# Patient Record
Sex: FEMALE | Race: WHITE | NOT HISPANIC OR LATINO | Employment: UNEMPLOYED | URBAN - METROPOLITAN AREA
[De-identification: names, ages, dates, MRNs, and addresses within clinical notes are randomized per-mention and may not be internally consistent; named-entity substitution may affect disease eponyms.]

---

## 2020-05-19 ENCOUNTER — TELEPHONE (OUTPATIENT)
Dept: OBSTETRICS AND GYNECOLOGY | Facility: CLINIC | Age: 65
End: 2020-05-19

## 2020-07-15 ENCOUNTER — TELEPHONE (OUTPATIENT)
Dept: OBSTETRICS AND GYNECOLOGY | Facility: CLINIC | Age: 65
End: 2020-07-15

## 2020-07-16 ENCOUNTER — TELEPHONE (OUTPATIENT)
Dept: OBSTETRICS AND GYNECOLOGY | Facility: CLINIC | Age: 65
End: 2020-07-16

## 2020-07-16 NOTE — TELEPHONE ENCOUNTER
----- Message from Gertrude Mccarthy sent at 7/16/2020  8:26 AM CDT -----  Regarding: Appt Access  Contact: Pt  Type:  Sooner Apoointment Request    Caller is requesting a sooner appointment.  Caller declined first available appointment listed below.  Caller will not accept being placed on the waitlist and is requesting a message be sent to doctor.  Name of Caller:Sabrinawaldo Marlow   When is the first available appointment?10/15/20  Symptoms:Re Pap  Would the patient rather a call back or a response via [facility]? Call back   Best Call Back Number:[phone]  Additional Information: States that she is leaving to go out of town on 07/28/20 and would like an appt before she left.

## 2020-07-19 NOTE — PROGRESS NOTES
"CC: repeat Pap      Sabrina Marlow is a 65 y.o. female  who comes in today for a repeat pap smear. Her most recent Pap smear was on 11/5/19 and showed low-grade squamous intraepithelial neoplasia (LGSIL - encompassing HPV,mild dysplasia,CAROL I). Patient does not have a history of abnormal pap smears. She has had a colposcopy 11/18/19 with benign biopsy at 12 o'clock and benign ECC.     Past Medical History:   Diagnosis Date    Bronchiolitis     Squamous cell cancer of lip       History reviewed. No pertinent surgical history.   Social History     Tobacco Use    Smoking status: Never Smoker    Smokeless tobacco: Never Used   Substance Use Topics    Alcohol use: Yes     Frequency: 2-3 times a week    Drug use: Never        Current Outpatient Medications:     albuterol sulfate 2.5 mg/0.5 mL Nebu, , Disp: , Rfl:     ARMOUR THYROID 60 mg Tab, , Disp: , Rfl:     sodium chloride 7% 7 % nebulizer solution, , Disp: , Rfl:    Review of patient's allergies indicates:  No Known Allergies     ROS:  GENERAL: Denies weight gain or weight loss. Feeling well overall.    NODES: Denies enlarged lymph nodes.   ABDOMEN: Denies abdominal pain, constipation, diarrhea, nausea, vomiting or rectal bleeding.   URINARY: Denies frequency, dysuria, hematuria, or burning on urination.  REPRODUCTIVE: See HPI.   HEMATOLOGIC: Denies easy bruisability or excessive bleeding with the exception of menstrual cycles.       PHYSICAL EXAM:    /62   Pulse 71   Ht 5' 9" (1.753 m)   Wt 64.8 kg (142 lb 12.8 oz)   LMP  (LMP Unknown)   BMI 21.09 kg/m²    Body mass index is 21.09 kg/m².     APPEARANCE: Well nourished, well developed, in no acute distress.  AFFECT: WNL, alert and oriented x 3  PELVIC: Normal external genitalia without lesions.  Normal hair distribution.  Adequate perineal body, normal urethral meatus.  Urethra and bladder without tenderness or masses. Vagina moist and well rugated without lesions or discharge.  Cervix pink, " without lesions, discharge or tenderness.        LGSIL on Pap smear of cervix  -     Liquid-based pap smear, screening      6 months follow up

## 2020-07-20 ENCOUNTER — OFFICE VISIT (OUTPATIENT)
Dept: OBSTETRICS AND GYNECOLOGY | Facility: CLINIC | Age: 65
End: 2020-07-20
Payer: COMMERCIAL

## 2020-07-20 VITALS
SYSTOLIC BLOOD PRESSURE: 101 MMHG | WEIGHT: 142.81 LBS | HEART RATE: 71 BPM | BODY MASS INDEX: 21.15 KG/M2 | HEIGHT: 69 IN | DIASTOLIC BLOOD PRESSURE: 62 MMHG

## 2020-07-20 DIAGNOSIS — R87.612 LGSIL ON PAP SMEAR OF CERVIX: Primary | ICD-10-CM

## 2020-07-20 PROCEDURE — 3008F BODY MASS INDEX DOCD: CPT | Mod: CPTII,S$GLB,, | Performed by: OBSTETRICS & GYNECOLOGY

## 2020-07-20 PROCEDURE — 3008F PR BODY MASS INDEX (BMI) DOCUMENTED: ICD-10-PCS | Mod: CPTII,S$GLB,, | Performed by: OBSTETRICS & GYNECOLOGY

## 2020-07-20 PROCEDURE — 99213 PR OFFICE/OUTPT VISIT, EST, LEVL III, 20-29 MIN: ICD-10-PCS | Mod: S$GLB,,, | Performed by: OBSTETRICS & GYNECOLOGY

## 2020-07-20 PROCEDURE — 99213 OFFICE O/P EST LOW 20 MIN: CPT | Mod: S$GLB,,, | Performed by: OBSTETRICS & GYNECOLOGY

## 2020-07-20 RX ORDER — ALBUTEROL SULFATE 2.5 MG/.5ML
SOLUTION RESPIRATORY (INHALATION)
COMMUNITY
Start: 2020-06-05 | End: 2023-03-20 | Stop reason: SDUPTHER

## 2020-07-20 RX ORDER — SULFAMETHOXAZOLE AND TRIMETHOPRIM 800; 160 MG/1; MG/1
TABLET ORAL
COMMUNITY
Start: 2020-07-17

## 2020-07-20 RX ORDER — SODIUM CHLORIDE FOR INHALATION 7 %
VIAL, NEBULIZER (ML) INHALATION
COMMUNITY
Start: 2020-06-05

## 2020-07-21 ENCOUNTER — TELEPHONE (OUTPATIENT)
Dept: OBSTETRICS AND GYNECOLOGY | Facility: CLINIC | Age: 65
End: 2020-07-21

## 2020-07-21 NOTE — TELEPHONE ENCOUNTER
----- Message from Miriam Wilson sent at 7/21/2020  4:42 PM CDT -----  Patient called in regards to speak to an nurse about her chart notes , please call back at [phone].        Thanks,  Miriam Wilson

## 2020-07-29 ENCOUNTER — TELEPHONE (OUTPATIENT)
Dept: OBSTETRICS AND GYNECOLOGY | Facility: CLINIC | Age: 65
End: 2020-07-29

## 2021-02-23 ENCOUNTER — OFFICE VISIT (OUTPATIENT)
Dept: OBSTETRICS AND GYNECOLOGY | Facility: CLINIC | Age: 66
End: 2021-02-23
Payer: MEDICARE

## 2021-02-23 VITALS
HEART RATE: 84 BPM | WEIGHT: 140.63 LBS | HEIGHT: 69 IN | SYSTOLIC BLOOD PRESSURE: 110 MMHG | DIASTOLIC BLOOD PRESSURE: 78 MMHG | BODY MASS INDEX: 20.83 KG/M2

## 2021-02-23 DIAGNOSIS — Z12.31 SCREENING MAMMOGRAM, ENCOUNTER FOR: ICD-10-CM

## 2021-02-23 DIAGNOSIS — Z13.820 SCREENING FOR OSTEOPOROSIS: ICD-10-CM

## 2021-02-23 DIAGNOSIS — Z01.419 WELL WOMAN EXAM WITH ROUTINE GYNECOLOGICAL EXAM: Primary | ICD-10-CM

## 2021-02-23 PROCEDURE — 1101F PR PT FALLS ASSESS DOC 0-1 FALLS W/OUT INJ PAST YR: ICD-10-PCS | Mod: CPTII,S$GLB,, | Performed by: OBSTETRICS & GYNECOLOGY

## 2021-02-23 PROCEDURE — G0101 CA SCREEN;PELVIC/BREAST EXAM: HCPCS | Mod: S$GLB,,, | Performed by: OBSTETRICS & GYNECOLOGY

## 2021-02-23 PROCEDURE — 3288F FALL RISK ASSESSMENT DOCD: CPT | Mod: CPTII,S$GLB,, | Performed by: OBSTETRICS & GYNECOLOGY

## 2021-02-23 PROCEDURE — 1126F PR PAIN SEVERITY QUANTIFIED, NO PAIN PRESENT: ICD-10-PCS | Mod: S$GLB,,, | Performed by: OBSTETRICS & GYNECOLOGY

## 2021-02-23 PROCEDURE — 3008F PR BODY MASS INDEX (BMI) DOCUMENTED: ICD-10-PCS | Mod: CPTII,S$GLB,, | Performed by: OBSTETRICS & GYNECOLOGY

## 2021-02-23 PROCEDURE — 3008F BODY MASS INDEX DOCD: CPT | Mod: CPTII,S$GLB,, | Performed by: OBSTETRICS & GYNECOLOGY

## 2021-02-23 PROCEDURE — 1126F AMNT PAIN NOTED NONE PRSNT: CPT | Mod: S$GLB,,, | Performed by: OBSTETRICS & GYNECOLOGY

## 2021-02-23 PROCEDURE — 1101F PT FALLS ASSESS-DOCD LE1/YR: CPT | Mod: CPTII,S$GLB,, | Performed by: OBSTETRICS & GYNECOLOGY

## 2021-02-23 PROCEDURE — 3288F PR FALLS RISK ASSESSMENT DOCUMENTED: ICD-10-PCS | Mod: CPTII,S$GLB,, | Performed by: OBSTETRICS & GYNECOLOGY

## 2021-02-23 PROCEDURE — G0101 PR CA SCREEN;PELVIC/BREAST EXAM: ICD-10-PCS | Mod: S$GLB,,, | Performed by: OBSTETRICS & GYNECOLOGY

## 2021-02-23 RX ORDER — PROGESTERONE 100 MG/1
100 CAPSULE ORAL DAILY
COMMUNITY

## 2021-03-22 ENCOUNTER — PATIENT MESSAGE (OUTPATIENT)
Dept: OBSTETRICS AND GYNECOLOGY | Facility: CLINIC | Age: 66
End: 2021-03-22

## 2021-03-23 ENCOUNTER — TELEPHONE (OUTPATIENT)
Dept: OBSTETRICS AND GYNECOLOGY | Facility: CLINIC | Age: 66
End: 2021-03-23

## 2022-02-28 ENCOUNTER — OFFICE VISIT (OUTPATIENT)
Dept: OBSTETRICS AND GYNECOLOGY | Facility: CLINIC | Age: 67
End: 2022-02-28
Payer: MEDICARE

## 2022-02-28 VITALS
HEART RATE: 80 BPM | DIASTOLIC BLOOD PRESSURE: 71 MMHG | SYSTOLIC BLOOD PRESSURE: 111 MMHG | HEIGHT: 69 IN | BODY MASS INDEX: 21.33 KG/M2 | WEIGHT: 144 LBS

## 2022-02-28 DIAGNOSIS — Z01.419 ENCOUNTER FOR ROUTINE GYNECOLOGIC EXAMINATION IN MEDICARE PATIENT: Primary | ICD-10-CM

## 2022-02-28 DIAGNOSIS — Z78.0 MENOPAUSE: ICD-10-CM

## 2022-02-28 DIAGNOSIS — Z12.31 SCREENING MAMMOGRAM, ENCOUNTER FOR: ICD-10-CM

## 2022-02-28 DIAGNOSIS — Z12.11 COLON CANCER SCREENING: ICD-10-CM

## 2022-02-28 PROCEDURE — G0101 PR CA SCREEN;PELVIC/BREAST EXAM: ICD-10-PCS | Mod: S$GLB,,, | Performed by: OBSTETRICS & GYNECOLOGY

## 2022-02-28 PROCEDURE — G0101 CA SCREEN;PELVIC/BREAST EXAM: HCPCS | Mod: S$GLB,,, | Performed by: OBSTETRICS & GYNECOLOGY

## 2022-02-28 RX ORDER — ZINC GLUCONATE 50 MG
50 TABLET ORAL
COMMUNITY

## 2022-02-28 RX ORDER — IBUPROFEN/PSEUDOEPHEDRINE HCL 200MG-30MG
40 TABLET ORAL DAILY
COMMUNITY

## 2022-02-28 RX ORDER — CEPHRADINE 500 MG
250 CAPSULE ORAL DAILY
COMMUNITY

## 2022-02-28 RX ORDER — CEPHRADINE 250 MG
180 CAPSULE ORAL DAILY
COMMUNITY

## 2022-02-28 NOTE — PROGRESS NOTES
Sabrina Marlow is a 67 y.o. female  who presents for a well woman exam.  She has no issues, problems, or complaints. Did testosterone pellets with Dr. Nichols since estrogen made her bleed.     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 No pertinent surgical history.    OB History    Para Term  AB Living   4 3       3   SAB IAB Ectopic Multiple Live Births                  # Outcome Date GA Lbr Michel/2nd Weight Sex Delivery Anes PTL Lv   4             3 Para            2 Para            1 Para                Family History   Problem Relation Age of Onset    No Known Problems Mother     No Known Problems Father     No Known Problems Sister     No Known Problems Brother     No Known Problems Maternal Grandmother     No Known Problems Maternal Grandfather     No Known Problems Paternal Grandmother     No Known Problems Paternal Grandfather     Cervical cancer Paternal Aunt        Social History     Tobacco Use    Smoking status: Never Smoker    Smokeless tobacco: Never Used   Substance Use Topics    Alcohol use: Yes    Drug use: Never         Current Outpatient Medications:     albuterol sulfate 2.5 mg/0.5 mL Nebu, , Disp: , Rfl:     ARMOUR THYROID 60 mg Tab, , Disp: , Rfl:     cholecalciferol, vitamin D3, (VITAMIN D3) 250 mcg (10,000 unit) Cap, Use as directed 250 mg in the mouth or throat once daily., Disp: , Rfl:     Lactobac 40-Bifido 3-S.thermop (PROBIOTIC) 100 billion cell Cap, Use as directed 40 mg in the mouth or throat once daily., Disp: , Rfl:     omega 3-dha-epa-fish oil (FISH OIL) 120-180-500 mg Cap, Use as directed 180 mg in the mouth or throat once daily., Disp: , Rfl:     progesterone (PROMETRIUM) 100 MG capsule, Take 100 mg by mouth once daily., Disp: , Rfl:     sodium  "chloride 7% 7 % nebulizer solution, , Disp: , Rfl:     zinc gluconate 50 mg tablet, Take 50 mg by mouth., Disp: , Rfl:      Review of patient's allergies indicates:  No Known Allergies     ROS:  GENERAL: Denies weight gain or weight loss. Feeling well overall.   SKIN: Denies rash or lesions.   HEAD: Denies head injury or headache.   NODES: Denies enlarged lymph nodes.   CHEST: Denies shortness of breath.   CARDIOVASCULAR: Denies palpitations or chest pain.   ABDOMEN: Denies abdominal pain, constipation, diarrhea, nausea, vomiting or rectal bleeding.   URINARY: Denies frequency, dysuria, hematuria, or burning on urination.  REPRODUCTIVE: See HPI.   BREASTS: Denies pain, lumps, or nipple discharge.   HEMATOLOGIC: Denies easy bruisability or excessive bleeding.  MUSCULOSKELETAL: Denies joint pain or swelling.   NEUROLOGIC: Denies syncope or weakness.   PSYCHIATRIC: Denies depression, anxiety or mood swings.    PHYSICAL EXAM:    /71   Pulse 80   Ht 5' 9" (1.753 m)   Wt 65.3 kg (144 lb)   LMP  (LMP Unknown)   BMI 21.27 kg/m²    Body mass index is 21.27 kg/m².     APPEARANCE: Well nourished, well developed, in no acute distress.  AFFECT: WNL, alert and oriented x 3  SKIN: No acne or hirsutism  NECK: Neck symmetric without masses or thyromegaly  NODES: No inguinal, cervical, axillary, or femoral lymph node enlargement  CHEST: Good respiratory effect  ABDOMEN: Soft.  No tenderness or masses.  No hepatosplenomegaly.  No hernias.  BREASTS: Symmetrical, no skin changes or visible lesions.  No palpable masses, nipple discharge bilaterally.  PELVIC: Normal external genitalia without lesions.  Normal hair distribution.  Adequate perineal body, normal urethral meatus.  Urethra and bladder without tenderness or masses. Vagina moist and well rugated without lesions or discharge.  Cervix pink, without lesions, discharge or tenderness.  No significant cystocele or rectocele.  Bimanual exam shows uterus to be normal size, " regular, mobile and nontender.  Adnexa without masses or tenderness.    EXTREMITIES: No edema.     Encounter for routine gynecologic examination in Medicare patient  -     Liquid-based pap smear, screening    Screening mammogram, encounter for  -     Mammo Digital Screening Bilat w/ Jg; Future    Menopause  -     DXA Bone Density Spine And Hip; Future    Colon cancer screening  -     Cologuard Screening (Multitarget Stool DNA); Future; Expected date: 02/28/2022         Patient was counseled today on A.C.S. Pap guidelines and recommendations for yearly pelvic exams, mammograms and monthly self breast exams; to see her PCP for other health maintenance.     Follow up in 1 year

## 2022-04-21 LAB — NONINV COLON CA DNA+OCC BLD SCRN STL QL: NEGATIVE

## 2022-05-04 ENCOUNTER — TELEPHONE (OUTPATIENT)
Dept: OBSTETRICS AND GYNECOLOGY | Facility: CLINIC | Age: 67
End: 2022-05-04
Payer: MEDICARE

## 2022-05-04 NOTE — TELEPHONE ENCOUNTER
Patient was wanting to know if Dr. Cullen would call out medicine for her daughter that was in town for post partal breast issues. She was told to go to Urgent Care since Dr. Cullen was not her current doctor or did PP care

## 2022-05-04 NOTE — TELEPHONE ENCOUNTER
----- Message from Elisa Bass sent at 5/4/2022  3:14 PM CDT -----  Contact: Patient  Patient need the nurse to call her regarding her daughter    Daughter name - Erika Robinson        Call back # [phone]

## 2023-03-20 ENCOUNTER — OFFICE VISIT (OUTPATIENT)
Dept: OBSTETRICS AND GYNECOLOGY | Facility: CLINIC | Age: 68
End: 2023-03-20
Payer: MEDICARE

## 2023-03-20 VITALS
BODY MASS INDEX: 20.64 KG/M2 | DIASTOLIC BLOOD PRESSURE: 70 MMHG | SYSTOLIC BLOOD PRESSURE: 110 MMHG | HEART RATE: 70 BPM | HEIGHT: 69 IN | WEIGHT: 139.38 LBS

## 2023-03-20 DIAGNOSIS — Z78.0 MENOPAUSE: Primary | ICD-10-CM

## 2023-03-20 PROCEDURE — 99213 PR OFFICE/OUTPT VISIT, EST, LEVL III, 20-29 MIN: ICD-10-PCS | Mod: S$GLB,,, | Performed by: OBSTETRICS & GYNECOLOGY

## 2023-03-20 PROCEDURE — 99213 OFFICE O/P EST LOW 20 MIN: CPT | Mod: S$GLB,,, | Performed by: OBSTETRICS & GYNECOLOGY

## 2023-03-20 RX ORDER — ALBUTEROL SULFATE 0.83 MG/ML
2.5 SOLUTION RESPIRATORY (INHALATION)
COMMUNITY
Start: 2022-05-25

## 2023-03-20 RX ORDER — SODIUM CHLORIDE FOR INHALATION 7 %
4 VIAL, NEBULIZER (ML) INHALATION
COMMUNITY
Start: 2022-05-25

## 2023-03-20 NOTE — PROGRESS NOTES
Chief Complaint: follow up     HPI:      Sabrina Marlow is a 68 y.o. female   No LMP recorded (lmp unknown). Patient is postmenopausal. She is doing well with testosterone pellets and estrogen cream from Dr. Nichols in [place].     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 No pertinent surgical history.   Social History     Tobacco Use    Smoking status: Never     Passive exposure: Never    Smokeless tobacco: Never   Substance Use Topics    Alcohol use: Yes    Drug use: Never      Current Outpatient Medications on File Prior to Visit   Medication Sig Dispense Refill    albuterol (PROVENTIL) 2.5 mg /3 mL (0.083 %) nebulizer solution Inhale 2.5 mg into the lungs.      ARMOUR THYROID 60 mg Tab       cholecalciferol, vitamin D3, (VITAMIN D3) 250 mcg (10,000 unit) Cap Use as directed 250 mg in the mouth or throat once daily.      GLUTATHIONE, BULK, MISC 2 (two) times daily.      Lactobac 40-Bifido 3-S.thermop (PROBIOTIC) 100 billion cell Cap Use as directed 40 mg in the mouth or throat once daily.      multivitamin with minerals tablet once daily.      omega 3-dha-epa-fish oil (FISH OIL) 120-180-500 mg Cap Use as directed 180 mg in the mouth or throat once daily.      progesterone (PROMETRIUM) 100 MG capsule Take 100 mg by mouth once daily.      sodium chloride 7% 7 % nebulizer solution       sodium chloride 7% 7 % nebulizer solution Take by nebulization every 4 to 6 hours as needed and as directed. 240 mL PRN    sodium chloride 7% 7 % nebulizer solution Inhale 4 mLs into the lungs.      zinc gluconate 50 mg tablet Take 50 mg by mouth.      [DISCONTINUED] albuterol sulfate 2.5 mg/0.5 mL Nebu        No current facility-administered medications on file prior to visit.      Review of patient's allergies indicates:  No Known Allergies       ROS:  "    GENERAL: Denies weight gain or weight loss. Feeling well overall.   SKIN: Denies rash or lesions.   NODES: Denies enlarged lymph nodes.   CARDIOVASCULAR: Denies palpitations or chest pain.   ABDOMEN: Denies abdominal pain, constipation, diarrhea, nausea, vomiting or rectal bleeding.   URINARY: Denies frequency, dysuria, hematuria, or burning on urination.  REPRODUCTIVE: See HPI.   BREASTS: Denies pain, lumps, or nipple discharge.   HEMATOLOGIC: Denies easy bruisability or excessive bleeding   PSYCHIATRIC: Denies depression, anxiety or mood swings.   Physical Exam:      /70 (BP Location: Left arm, Patient Position: Sitting, BP Method: Medium (Manual))   Pulse 70   Ht 5' 9" (1.753 m)   Wt 63.2 kg (139 lb 6.4 oz)   LMP  (LMP Unknown)   BMI 20.59 kg/m²   Body mass index is 20.59 kg/m².   BREASTS: no adenopathy, masses, skin changes, nipple discharge  ABDOMEN: Soft.  No tenderness or masses. No hepatoslenomegaly. No hernias.   PELVIC: Normal external genitalia without lesions.  Normal hair distribution.  Adequate perineal body, normal urethral meatus.  Urethra and bladder without tenderness or masses. Vagina moist and well rugated without lesions or discharge.  Cervix pink, without lesions, discharge or tenderness.  No significant cystocele or rectocele.  Bimanual exam shows uterus to be normal size, regular, mobile and nontender.  Adnexa without masses or tenderness.      Assessment/Plan:     Menopause    Normal exam    "

## 2023-03-29 ENCOUNTER — TELEPHONE (OUTPATIENT)
Dept: OBSTETRICS AND GYNECOLOGY | Facility: CLINIC | Age: 68
End: 2023-03-29
Payer: MEDICARE

## 2023-03-29 NOTE — TELEPHONE ENCOUNTER
----- Message from Latoya Cullen MD sent at 3/29/2023 12:35 PM CDT -----  Please notify patient of normal bone density result.

## 2025-06-05 ENCOUNTER — OFFICE VISIT (OUTPATIENT)
Dept: OBSTETRICS AND GYNECOLOGY | Facility: CLINIC | Age: 70
End: 2025-06-05
Payer: MEDICARE

## 2025-06-05 VITALS
HEART RATE: 88 BPM | DIASTOLIC BLOOD PRESSURE: 74 MMHG | WEIGHT: 136.38 LBS | SYSTOLIC BLOOD PRESSURE: 115 MMHG | BODY MASS INDEX: 20.14 KG/M2

## 2025-06-05 DIAGNOSIS — N95.2 VAGINAL ATROPHY: ICD-10-CM

## 2025-06-05 DIAGNOSIS — R31.9 HEMATURIA, UNSPECIFIED TYPE: ICD-10-CM

## 2025-06-05 DIAGNOSIS — R30.0 DYSURIA: Primary | ICD-10-CM

## 2025-06-05 RX ORDER — ESTRADIOL 0.07 MG/D
1 FILM, EXTENDED RELEASE TRANSDERMAL
COMMUNITY
Start: 2024-09-25

## 2025-06-05 RX ORDER — NITROFURANTOIN 25; 75 MG/1; MG/1
100 CAPSULE ORAL 2 TIMES DAILY
Qty: 14 CAPSULE | Refills: 0 | Status: SHIPPED | OUTPATIENT
Start: 2025-06-05 | End: 2025-06-12

## 2025-06-05 RX ORDER — UMECLIDINIUM BROMIDE AND VILANTEROL TRIFENATATE 62.5; 25 UG/1; UG/1
1 POWDER RESPIRATORY (INHALATION)
COMMUNITY
Start: 2025-05-19

## 2025-06-05 RX ORDER — METOPROLOL SUCCINATE 25 MG/1
TABLET, EXTENDED RELEASE ORAL
COMMUNITY
Start: 2025-04-15

## 2025-06-07 LAB — URINE CULTURE, ROUTINE: NORMAL

## 2025-06-09 DIAGNOSIS — N39.0 URINARY TRACT INFECTION WITH HEMATURIA, SITE UNSPECIFIED: Primary | ICD-10-CM

## 2025-06-09 DIAGNOSIS — R31.9 URINARY TRACT INFECTION WITH HEMATURIA, SITE UNSPECIFIED: Primary | ICD-10-CM

## 2025-06-23 ENCOUNTER — OFFICE VISIT (OUTPATIENT)
Dept: UROLOGY | Facility: CLINIC | Age: 70
End: 2025-06-23
Payer: MEDICARE

## 2025-06-23 VITALS
HEIGHT: 69 IN | WEIGHT: 135 LBS | SYSTOLIC BLOOD PRESSURE: 108 MMHG | HEART RATE: 70 BPM | BODY MASS INDEX: 19.99 KG/M2 | DIASTOLIC BLOOD PRESSURE: 72 MMHG | OXYGEN SATURATION: 95 %

## 2025-06-23 DIAGNOSIS — R31.29 MICROSCOPIC HEMATURIA: ICD-10-CM

## 2025-06-23 DIAGNOSIS — R31.9 HEMATURIA, UNSPECIFIED TYPE: Primary | ICD-10-CM

## 2025-06-23 LAB
BILIRUBIN, UA POC OHS: NEGATIVE
BLOOD, UA POC OHS: NEGATIVE
CLARITY, UA POC OHS: CLEAR
COLOR, UA POC OHS: YELLOW
GLUCOSE, UA POC OHS: NEGATIVE
KETONES, UA POC OHS: NEGATIVE
LEUKOCYTES, UA POC OHS: NEGATIVE
NITRITE, UA POC OHS: NEGATIVE
PH, UA POC OHS: 7
PROTEIN, UA POC OHS: NEGATIVE
SPECIFIC GRAVITY, UA POC OHS: 1.01
UROBILINOGEN, UA POC OHS: 0.2

## 2025-06-23 PROCEDURE — 99204 OFFICE O/P NEW MOD 45 MIN: CPT | Mod: S$PBB,,, | Performed by: FAMILY MEDICINE

## 2025-06-23 NOTE — PROGRESS NOTES
Subjective:       Patient ID: Sabrina Marlow is a 70 y.o. female.    Chief Complaint: No chief complaint on file.      HPI: 70-year-old female patient presenting to the clinic to establish care.  Patient has recently had 2 urinary tract infections.  During these infection she had microscopic hematuria and was referred to our clinic.  She denies any episodes of gross hematuria.  She is not a smoker.  Urinalysis outside of infections have always been negative for any hematuria         Past Medical History:   Past Medical History:   Diagnosis Date    Bronchiectasis 2015    due to mycobacteria    Colon cancer screening 04/21/2022    Cologuard negative    Squamous cell cancer of lip        Past Surgical Historical: History reviewed. No pertinent surgical history.     Medications:   Medication List with Changes/Refills   Current Medications    ALBUTEROL (PROVENTIL) 2.5 MG /3 ML (0.083 %) NEBULIZER SOLUTION    Inhale 2.5 mg into the lungs.    ARMOUR THYROID 60 MG TAB        CHOLECALCIFEROL, VITAMIN D3, (VITAMIN D3) 250 MCG (10,000 UNIT) CAP    Use as directed 250 mg in the mouth or throat once daily.    CONJUGATED ESTROGENS (PREMARIN) VAGINAL CREAM    Place 1 g vaginally every Mon, Wed, Fri, Sun.    ESTRADIOL (VIVELLE-DOT) 0.075 MG/24 HR    Place 1 patch onto the skin.    GLUTATHIONE, BULK, MISC    2 (two) times daily.    LACTOBAC 40-BIFIDO 3-S.THERMOP (PROBIOTIC) 100 BILLION CELL CAP    Use as directed 40 mg in the mouth or throat once daily.    METOPROLOL SUCCINATE (TOPROL-XL) 25 MG 24 HR TABLET        MULTIVITAMIN WITH MINERALS TABLET    once daily.    OMEGA 3-DHA-EPA-FISH OIL (FISH OIL) 120-180-500 MG CAP    Use as directed 180 mg in the mouth or throat once daily.    PROGESTERONE (PROMETRIUM) 100 MG CAPSULE    Take 100 mg by mouth once daily.    SODIUM CHLORIDE 7% 7 % NEBULIZER SOLUTION        SODIUM CHLORIDE 7% 7 % NEBULIZER SOLUTION    Take by nebulization every 4 to 6 hours as needed and as directed.     SODIUM CHLORIDE 7% 7 % NEBULIZER SOLUTION    Inhale 4 mLs into the lungs.    UMECLIDINIUM-VILANTEROL (ANORO ELLIPTA) 62.5-25 MCG/ACTUATION DSDV    Inhale 1 puff into the lungs.    ZINC GLUCONATE 50 MG TABLET    Take 50 mg by mouth.        Past Social History: Social History[1]    Allergies: Review of patient's allergies indicates:  No Known Allergies     Family History:   Family History   Problem Relation Name Age of Onset    Heart disease Father      Diabetes Mother      Cervical cancer Paternal Aunt          Review of Systems:  Review of Systems   Constitutional:  Negative for activity change, appetite change, chills, diaphoresis, fatigue, fever and unexpected weight change.   HENT:  Negative for congestion, dental problem, drooling, ear discharge, ear pain, facial swelling, hearing loss, mouth sores, nosebleeds, postnasal drip, rhinorrhea, sinus pressure, sinus pain, sneezing, sore throat, tinnitus, trouble swallowing and voice change.    Eyes:  Negative for photophobia, pain, discharge, redness, itching and visual disturbance.   Respiratory:  Negative for apnea, cough, choking, chest tightness, shortness of breath, wheezing and stridor.    Cardiovascular:  Negative for chest pain and leg swelling.   Gastrointestinal:  Negative for abdominal distention, abdominal pain, anal bleeding, blood in stool, constipation, diarrhea, nausea, rectal pain and vomiting.   Endocrine: Negative for cold intolerance, heat intolerance, polydipsia, polyphagia and polyuria.   Genitourinary: Negative.  Negative for decreased urine volume, difficulty urinating, dyspareunia, dysuria, enuresis, flank pain, frequency, genital sores, hematuria, menstrual problem, pelvic pain, urgency, vaginal bleeding, vaginal discharge and vaginal pain.   Musculoskeletal:  Negative for arthralgias, back pain, gait problem, joint swelling, myalgias, neck pain and neck stiffness.   Skin:  Negative for color change, pallor, rash and wound.    Allergic/Immunologic: Negative for environmental allergies, food allergies and immunocompromised state.   Neurological:  Negative for dizziness, tremors, seizures, syncope, facial asymmetry, speech difficulty, weakness, light-headedness, numbness and headaches.   Hematological:  Negative for adenopathy. Does not bruise/bleed easily.   Psychiatric/Behavioral:  Negative for agitation, behavioral problems, confusion, decreased concentration, dysphoric mood, hallucinations, self-injury, sleep disturbance and suicidal ideas. The patient is not nervous/anxious and is not hyperactive.        Physical Exam:  Physical Exam  Vitals reviewed.   Constitutional:       Appearance: She is normal weight.   HENT:      Head: Normocephalic.      Nose: Nose normal.      Mouth/Throat:      Mouth: Mucous membranes are moist.      Pharynx: Oropharynx is clear.   Eyes:      Extraocular Movements: Extraocular movements intact.      Conjunctiva/sclera: Conjunctivae normal.   Cardiovascular:      Rate and Rhythm: Normal rate.      Pulses: Normal pulses.   Pulmonary:      Effort: Pulmonary effort is normal. No respiratory distress.   Abdominal:      General: Abdomen is flat.   Musculoskeletal:         General: Normal range of motion.      Cervical back: Normal range of motion and neck supple.   Skin:     General: Skin is dry.   Neurological:      General: No focal deficit present.      Mental Status: She is alert and oriented to person, place, and time. Mental status is at baseline.   Psychiatric:         Mood and Affect: Mood normal.         Behavior: Behavior normal.         Thought Content: Thought content normal.         Judgment: Judgment normal.         Assessment/Plan:     Microscopic hematuria with infection: Urinalysis negative for hematuria or infection today.  Hematuria has only occurred during infections.  She does not have a history of smoking.  She denies any episodes of gross hematuria.  Complete urine drop-off for any  symptoms of infection or hematuria in the future.  Patient is low risk and has not had hematuria in the absence of symptoms.  We will avoid hematuria workup at this time.  Problem List Items Addressed This Visit    None  Visit Diagnoses         Hematuria, unspecified type        Relevant Orders    POCT Urinalysis(Instrument) (Completed)                    [1]   Social History  Socioeconomic History    Marital status:    Tobacco Use    Smoking status: Never     Passive exposure: Never    Smokeless tobacco: Never   Substance and Sexual Activity    Alcohol use: Yes    Drug use: Never    Sexual activity: Yes     Partners: Male     Birth control/protection: Post-menopausal

## 2025-08-01 ENCOUNTER — CLINICAL SUPPORT (OUTPATIENT)
Dept: UROLOGY | Facility: CLINIC | Age: 70
End: 2025-08-01
Payer: MEDICARE

## 2025-08-01 DIAGNOSIS — R31.9 HEMATURIA, UNSPECIFIED TYPE: Primary | ICD-10-CM

## 2025-08-01 DIAGNOSIS — R82.90 ABNORMAL URINALYSIS: ICD-10-CM

## 2025-08-01 LAB
BILIRUBIN, UA POC OHS: NEGATIVE
BLOOD, UA POC OHS: ABNORMAL
CLARITY, UA POC OHS: CLEAR
COLOR, UA POC OHS: YELLOW
GLUCOSE, UA POC OHS: NEGATIVE
KETONES, UA POC OHS: NEGATIVE
LEUKOCYTES, UA POC OHS: ABNORMAL
NITRITE, UA POC OHS: POSITIVE
PH, UA POC OHS: 7
PROTEIN, UA POC OHS: NEGATIVE
SPECIFIC GRAVITY, UA POC OHS: 1.01
UROBILINOGEN, UA POC OHS: 0.2

## 2025-08-01 RX ORDER — AMOXICILLIN AND CLAVULANATE POTASSIUM 875; 125 MG/1; MG/1
1 TABLET, FILM COATED ORAL 2 TIMES DAILY
Qty: 14 TABLET | Refills: 0 | Status: SHIPPED | OUTPATIENT
Start: 2025-08-01 | End: 2025-08-08

## 2025-08-01 NOTE — PROGRESS NOTES
Patient did a urine drop off due to she states she has a UTI that she has been having for a couple of days or a week. Patient states she has not taken anything.

## 2025-08-02 LAB — URINE CULTURE, ROUTINE: NORMAL

## 2025-08-04 ENCOUNTER — TELEPHONE (OUTPATIENT)
Dept: UROLOGY | Facility: CLINIC | Age: 70
End: 2025-08-04
Payer: MEDICARE

## 2025-08-04 RX ORDER — NITROFURANTOIN 25; 75 MG/1; MG/1
100 CAPSULE ORAL 2 TIMES DAILY
Qty: 14 CAPSULE | Refills: 0 | Status: SHIPPED | OUTPATIENT
Start: 2025-08-04 | End: 2025-08-11

## 2025-08-04 NOTE — TELEPHONE ENCOUNTER
Patient was called and informed that macrobid was sent to her pharmacy and to take 1 capsule by mouth twice a day for 7 days. Patient verbalized understanding.     Patient call was returned and she stated she started taking the augmentin on Friday and then Saturday morning she started having diarrhea. She stated she stopped taking the medication and started feeling better but still have bloating. She was informed that her urine culture came back stating she has E Coli noted in urine and needs to take an antibiotic. She was informed that this will be brought to [facility] to see if there is another medication she can take and we will call her back and let her know.     Copied from CRM #[id number]. Topic: Medications - Medication Question  >> Aug 4, 2025  8:24 AM Vivi wrote:  Pt calling about amoxicillin-clavulanate 875-125mg (AUGMENTIN) 875-125 mg  stating she is having issues w/diaherra and blotting and stopped taking.  Pt would like a call back to discuss.  Pt would like new script for microbid instead and can be reached at [phone]       [facility] [address], [address]  [address], [address]  [facility] [address]  Phone: [phone] Fax: [phone]

## 2025-08-07 ENCOUNTER — TELEPHONE (OUTPATIENT)
Dept: UROLOGY | Facility: CLINIC | Age: 70
End: 2025-08-07
Payer: MEDICARE

## 2025-08-07 NOTE — TELEPHONE ENCOUNTER
Pt C/O bloating, keeps her up at night. Went to Urgent Care in [place] while on a trip, u/a collected and came back negative. Pt stopped Macrobid d/t clear urinalysis. Explained to pt d/t her being on an abx, the u/a will come back as a false negative, continue taking full course of Macrobid until completed. Pt questioned if she can take one Macrobid instead of 2. Advised pt to follow instructions to complete full course of Macrobid BID x7days. Pt verbalized understanding. Taking Gas-x to ease bloating. Diarrhea has cleared. Pt has had small BM. Advised pt if GI issues occur after completing full course of abx, follow up with PCP. Pt verbalized understanding.MARIA C      Copied from CRM #[id number]. Topic: General Inquiry - Information Request  >> Aug 7, 2025  8:03 AM Yvrose wrote:   Patient is requesting a call back in regards bad pain please call her back at [phone]  >> Aug 7, 2025  8:55 AM Med Assistant Maya wrote:    ----- Message -----  From: Yvrose Arita  Sent: 8/7/2025   8:04 AM CDT  To: [facility] Staff

## 2025-08-11 ENCOUNTER — TELEPHONE (OUTPATIENT)
Dept: UROLOGY | Facility: CLINIC | Age: 70
End: 2025-08-11
Payer: MEDICARE

## 2025-08-18 ENCOUNTER — CLINICAL SUPPORT (OUTPATIENT)
Dept: UROLOGY | Facility: CLINIC | Age: 70
End: 2025-08-18
Payer: MEDICARE

## 2025-08-18 DIAGNOSIS — R31.9 HEMATURIA, UNSPECIFIED TYPE: Primary | ICD-10-CM

## 2025-08-18 LAB
BILIRUBIN, UA POC OHS: NEGATIVE
BLOOD, UA POC OHS: NEGATIVE
CLARITY, UA POC OHS: CLEAR
COLOR, UA POC OHS: ABNORMAL
GLUCOSE, UA POC OHS: NEGATIVE
KETONES, UA POC OHS: NEGATIVE
LEUKOCYTES, UA POC OHS: NEGATIVE
NITRITE, UA POC OHS: NEGATIVE
PH, UA POC OHS: 6
POC RESIDUAL URINE VOLUME: 0 ML (ref 0–100)
PROTEIN, UA POC OHS: NEGATIVE
SPECIFIC GRAVITY, UA POC OHS: 1.01
UROBILINOGEN, UA POC OHS: 0.2

## 2025-08-19 ENCOUNTER — TELEPHONE (OUTPATIENT)
Dept: UROLOGY | Facility: CLINIC | Age: 70
End: 2025-08-19
Payer: MEDICARE

## 2025-08-19 DIAGNOSIS — N39.0 URINARY TRACT INFECTION WITHOUT HEMATURIA, SITE UNSPECIFIED: Primary | ICD-10-CM

## 2025-08-20 RX ORDER — SULFAMETHOXAZOLE AND TRIMETHOPRIM 800; 160 MG/1; MG/1
1 TABLET ORAL 2 TIMES DAILY
Qty: 14 TABLET | Refills: 0 | Status: SHIPPED | OUTPATIENT
Start: 2025-08-20 | End: 2025-08-27

## 2025-08-23 ENCOUNTER — PATIENT MESSAGE (OUTPATIENT)
Dept: RESEARCH | Facility: HOSPITAL | Age: 70
End: 2025-08-23
Payer: MEDICARE